# Patient Record
Sex: FEMALE | Race: WHITE | Employment: UNEMPLOYED | URBAN - METROPOLITAN AREA
[De-identification: names, ages, dates, MRNs, and addresses within clinical notes are randomized per-mention and may not be internally consistent; named-entity substitution may affect disease eponyms.]

---

## 2019-07-29 ENCOUNTER — APPOINTMENT (OUTPATIENT)
Dept: GENERAL RADIOLOGY | Age: 39
End: 2019-07-29
Attending: NURSE PRACTITIONER
Payer: MEDICAID

## 2019-07-29 ENCOUNTER — HOSPITAL ENCOUNTER (EMERGENCY)
Age: 39
Discharge: HOME | End: 2019-07-29
Attending: EMERGENCY MEDICINE
Payer: MEDICAID

## 2019-07-29 VITALS
HEART RATE: 91 BPM | SYSTOLIC BLOOD PRESSURE: 137 MMHG | RESPIRATION RATE: 16 BRPM | TEMPERATURE: 98.7 F | OXYGEN SATURATION: 99 % | DIASTOLIC BLOOD PRESSURE: 92 MMHG

## 2019-07-29 DIAGNOSIS — S82.892A CLOSED FRACTURE OF LEFT ANKLE, INITIAL ENCOUNTER: Primary | ICD-10-CM

## 2019-07-29 PROCEDURE — 99283 EMERGENCY DEPT VISIT LOW MDM: CPT

## 2019-07-29 PROCEDURE — 73610 X-RAY EXAM OF ANKLE: CPT

## 2019-07-29 PROCEDURE — 74011250637 HC RX REV CODE- 250/637: Performed by: NURSE PRACTITIONER

## 2019-07-29 PROCEDURE — 75810000053 HC SPLINT APPLICATION

## 2019-07-29 RX ORDER — IBUPROFEN 800 MG/1
800 TABLET ORAL
Qty: 20 TAB | Refills: 0 | Status: SHIPPED | OUTPATIENT
Start: 2019-07-29 | End: 2019-08-05

## 2019-07-29 RX ORDER — OXYCODONE AND ACETAMINOPHEN 5; 325 MG/1; MG/1
1 TABLET ORAL
Qty: 15 TAB | Refills: 0 | Status: SHIPPED | OUTPATIENT
Start: 2019-07-29 | End: 2019-08-01

## 2019-07-29 RX ORDER — OXYCODONE AND ACETAMINOPHEN 5; 325 MG/1; MG/1
2 TABLET ORAL
Status: COMPLETED | OUTPATIENT
Start: 2019-07-29 | End: 2019-07-29

## 2019-07-29 RX ADMIN — OXYCODONE HYDROCHLORIDE AND ACETAMINOPHEN 2 TABLET: 5; 325 TABLET ORAL at 21:35

## 2019-07-30 NOTE — DISCHARGE INSTRUCTIONS
Patient Education        Broken Ankle: Care Instructions  Your Care Instructions    An ankle may break (fracture) during sports, a fall, or other accidents. Fractures can range from a small, hairline crack, to a bone or bones broken into two or more pieces. Your treatment depends on how bad the break is. Your doctor may have put your ankle in a splint or cast to allow it to heal or to keep it stable until you see another doctor. It may take weeks or months for your ankle to heal. You can help your ankle heal with some care at home. You heal best when you take good care of yourself. Eat a variety of healthy foods, and don't smoke. You may have had a sedative to help you relax. You may be unsteady after having sedation. It can take a few hours for the medicine's effects to wear off. Common side effects of sedation include nausea, vomiting, and feeling sleepy or tired. The doctor has checked you carefully, but problems can develop later. If you notice any problems or new symptoms,  get medical treatment right away. Follow-up care is a key part of your treatment and safety. Be sure to make and go to all appointments, and call your doctor if you are having problems. It's also a good idea to know your test results and keep a list of the medicines you take. How can you care for yourself at home? · If the doctor gave you a sedative:  ? For 24 hours, don't do anything that requires attention to detail, such as going to work, making important decisions, or signing any legal documents. It takes time for the medicine's effects to completely wear off.  ? For your safety, do not drive or operate any machinery that could be dangerous. Wait until the medicine wears off and you can think clearly and react easily. · Put ice or a cold pack on your ankle for 10 to 20 minutes at a time. Try to do this every 1 to 2 hours for the next 3 days (when you are awake). Put a thin cloth between the ice and your cast or splint.  Keep your cast or splint dry. · Follow the cast care instructions your doctor gives you. If you have a splint, do not take it off unless your doctor tells you to. · Be safe with medicines. Take pain medicines exactly as directed. ? If the doctor gave you a prescription medicine for pain, take it as prescribed. ? If you are not taking a prescription pain medicine, ask your doctor if you can take an over-the-counter medicine. · Prop up your leg on pillows in the first few days after the injury. Keep the ankle higher than the level of your heart. This will help reduce swelling. · Do not put weight on your ankle unless your doctor tells you to. Use crutches to walk. · Follow instructions for exercises to keep your leg strong. · Wiggle your toes often to reduce swelling and stiffness. When should you call for help? Call 911 anytime you think you may need emergency care. For example, call if:    · You have chest pain, are short of breath, or you cough up blood.     · You are very sleepy and you have trouble waking up.    Call your doctor now or seek immediate medical care if:    · You have new or worse nausea or vomiting.     · You have new or worse pain.     · Your foot is cool or pale or changes color.     · You have tingling, weakness, or numbness in your toes.     · Your cast or splint feels too tight.     · You have signs of a blood clot in your leg (called a deep vein thrombosis), such as:  ? Pain in your calf, back of the knee, thigh, or groin. ? Redness or swelling in your leg.    Watch closely for changes in your health, and be sure to contact your doctor if:    · You have a problem with your splint or cast.     · You do not get better as expected. Where can you learn more? Go to http://sanaz-brian.info/. Enter P763 in the search box to learn more about \"Broken Ankle: Care Instructions. \"  Current as of: September 20, 2018  Content Version: 12.1  © 3713-8618 Healthwise, [facility].  Care instructions adapted under license by [employer] (which disclaims liability or warranty for this information). If you have questions about a medical condition or this instruction, always ask your healthcare professional. [address] any warranty or liability for your use of this information.

## 2019-07-30 NOTE — ED PROVIDER NOTES
81-year-old female with no significant medical history presents to the emergency department with left ankle pain patient states that she stepped off a curb heard a pop and then had trouble walking. Patient thought at first she could just treat herself but states that it got worse states that it happened a few hours ago. No deformity noted    The history is provided by the patient. No  was used. Ankle Injury    This is a new problem. The current episode started 6 to 12 hours ago. The problem occurs constantly. The problem has not changed since onset. The pain is present in the left ankle. The quality of the pain is described as aching. The pain is at a severity of 6/10. The pain is moderate. Pertinent negatives include no numbness, no stiffness, no tingling, no itching, no back pain and no neck pain. The symptoms are aggravated by palpation and movement. She has tried nothing for the symptoms. There has been a history of trauma. History reviewed. No pertinent past medical history. History reviewed. No pertinent surgical history. History reviewed. No pertinent family history.     Social History     Socioeconomic History    Marital status: LIFE PARTNER     Spouse name: Not on file    Number of children: Not on file    Years of education: Not on file    Highest education level: Not on file   Occupational History    Not on file   Social Needs    Financial resource strain: Not on file    Food insecurity:     Worry: Not on file     Inability: Not on file    Transportation needs:     Medical: Not on file     Non-medical: Not on file   Tobacco Use    Smoking status: Not on file   Substance and Sexual Activity    Alcohol use: Not on file    Drug use: Not on file    Sexual activity: Not on file   Lifestyle    Physical activity:     Days per week: Not on file     Minutes per session: Not on file    Stress: Not on file   Relationships    Social connections:     Talks on phone: Not on file     Gets together: Not on file     Attends Mandaen service: Not on file     Active member of club or organization: Not on file     Attends meetings of clubs or organizations: Not on file     Relationship status: Not on file    Intimate partner violence:     Fear of current or ex partner: Not on file     Emotionally abused: Not on file     Physically abused: Not on file     Forced sexual activity: Not on file   Other Topics Concern    Not on file   Social History Narrative    Not on file         ALLERGIES: Sulfa (sulfonamide antibiotics)    Review of Systems   Constitutional: Negative for fever. Musculoskeletal: Positive for arthralgias. Negative for back pain, neck pain and stiffness. Skin: Negative for itching. Neurological: Negative for tingling and numbness. All other systems reviewed and are negative. Vitals:    07/29/19 1933   BP: (!) 137/92   Pulse: 91   Resp: 16   Temp: 98.7 °F (37.1 °C)   SpO2: 99%            Physical Exam   Constitutional: She is oriented to person, place, and time. She appears well-developed and well-nourished. HENT:   Head: Normocephalic and atraumatic. Eyes: Pupils are equal, round, and reactive to light. Conjunctivae and EOM are normal.   Neck: Normal range of motion. Neck supple. Cardiovascular: Normal rate and regular rhythm. Pulmonary/Chest: Effort normal and breath sounds normal.   Abdominal: Soft. Bowel sounds are normal.   Musculoskeletal: She exhibits tenderness. She exhibits no edema or deformity. Feet:    + Tenderness to the lateral left ankle minor swelling noted no deformity noted normal distal circulation to the toes normal capillary refill   Neurological: She is alert and oriented to person, place, and time. She has normal reflexes. Skin: Skin is warm and dry. Psychiatric: She has a normal mood and affect. Her behavior is normal. Judgment and thought content normal.   Nursing note and vitals reviewed.        MDM  Number of Diagnoses or Management Options  Closed fracture of left ankle, initial encounter: minor  Diagnosis management comments: Noted a avulsion fracture to the very distal lateral malleolus short leg Ortho-Glass splint is placed crutches are ordered patient will be discharged to the emergency department for follow-up with orthopedics and provided pain medicine and anti-inflammatories. Amount and/or Complexity of Data Reviewed  Tests in the radiology section of CPT®: ordered and reviewed  Review and summarize past medical records: yes  Independent visualization of images, tracings, or specimens: yes    Risk of Complications, Morbidity, and/or Mortality  Presenting problems: low  Diagnostic procedures: low  Management options: low    Patient Progress  Patient progress: improved         Procedures          Vitals:  Patient Vitals for the past 12 hrs:   Temp Pulse Resp BP SpO2   07/29/19 1933 98.7 °F (37.1 °C) 91 16 (!) 137/92 99 %       Medications ordered:   Medications   oxyCODONE-acetaminophen (PERCOCET) 5-325 mg per tablet 2 Tab (2 Tabs Oral Given 7/29/19 2270)            X-Ray, CT or other radiology findings or impressions:  XR ANKLE LT MIN 3 V   Final Result   IMPRESSION:      Minimally distracted fracture through the fibular tip. Marked overlying soft   tissue swelling. Disposition:    Diagnosis:   1. Closed fracture of left ankle, initial encounter        Disposition: to Home      Follow-up Information     Follow up With Specialties Details Why Contact Info    Germain Leyden, MD Orthopedic Surgery In 2 days  [address] [facility]  [phone]             Discharge Medication List as of 7/29/2019  9:27 PM      START taking these medications    Details   oxyCODONE-acetaminophen (PERCOCET) 5-325 mg per tablet Take 1 Tab by mouth every six (6) hours as needed for Pain for up to 3 days.  Max Daily Amount: 4 Tabs., Print, Disp-15 Tab, R-0      ibuprofen (MOTRIN) 800 mg tablet Take 1 Tab by mouth every six (6) hours as needed for Pain for up to 7 days. , Print, Disp-20 Tab, R-0             Return to the ER if you are unable to obtain referral as directed. Georgia Hanson's  results have been reviewed with her. She has been counseled regarding her diagnosis, treatment, and plan. She verbally conveys understanding and agreement of the signs, symptoms, diagnosis, treatment and prognosis and additionally agrees to follow up as discussed. She also agrees with the care-plan and conveys that all of her questions have been answered. I have also provided discharge instructions for her that include: educational information regarding their diagnosis and treatment, and list of reasons why they would want to return to the ED prior to their follow-up appointment, should her condition change. Isabel Shay ENP-C,FNP-C      Dragon voice recognition was used to generate this report, which may have resulted in some phonetic based errors in grammar and contents.  Even though attempts were made to correct all the mistakes, some may have been missed, and remained in the body of the document

## 2019-07-31 ENCOUNTER — OFFICE VISIT (OUTPATIENT)
Dept: ORTHOPEDIC SURGERY | Age: 39
End: 2019-07-31

## 2019-07-31 VITALS
SYSTOLIC BLOOD PRESSURE: 131 MMHG | HEIGHT: 64 IN | HEART RATE: 112 BPM | DIASTOLIC BLOOD PRESSURE: 85 MMHG | OXYGEN SATURATION: 97 % | TEMPERATURE: 97.9 F

## 2019-07-31 DIAGNOSIS — M25.572 ACUTE LEFT ANKLE PAIN: ICD-10-CM

## 2019-07-31 DIAGNOSIS — T14.8XXA AVULSION FRACTURE: ICD-10-CM

## 2019-07-31 DIAGNOSIS — S82.62XA CLOSED FRACTURE OF DISTAL LATERAL MALLEOLUS OF LEFT FIBULA, INITIAL ENCOUNTER: Primary | ICD-10-CM

## 2019-07-31 RX ORDER — MEDROXYPROGESTERONE ACETATE 150 MG/ML
150 INJECTION, SUSPENSION INTRAMUSCULAR ONCE
COMMUNITY

## 2019-07-31 RX ORDER — HYDROCODONE BITARTRATE AND ACETAMINOPHEN 7.5; 325 MG/1; MG/1
1-2 TABLET ORAL
Qty: 60 TAB | Refills: 0 | Status: SHIPPED | OUTPATIENT
Start: 2019-07-31 | End: 2019-08-15

## 2019-07-31 RX ORDER — HYDROCODONE BITARTRATE AND ACETAMINOPHEN 5; 325 MG/1; MG/1
TABLET ORAL
COMMUNITY

## 2019-07-31 NOTE — PROGRESS NOTES
Patient: Marychuy Sims                MRN: [id number]       SSN: [id number]  YOB: 1980        AGE: 41 y.o. SEX: female    PCP: No primary care provider on file.  07/31/19    Chief Complaint   Patient presents with    Ankle Pain     left     HISTORY:  Marychuy Sims is a 41 y.o. female who sustained a left ankle injury on 7/29/19. She fell when she tripped down the stairs as she was checking out a house to potentially flip. She tripped fell on concrete porch steps. She heard and felt her ankle pop when she fell. She turned suddenly to go after her 3 yo daughter who was running toward a pool with the pool gate open. She will be returning to [place] in the next few weeks for her children to start school. She also has a house in this area near her mother in law. She was seen at [facility] on the same day where left ankle x rays revealed minimally displaced lateral malleolus avulsion fracture. She was referred for orthopedic consultation. She has been experiencing lateral left ankle pain and swelling since the injury. 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 She has 3 children and her fiance Michell Vera) has 1 son. She has a 25, 21, and 10 yo sons and a 3 yo daughter. She is from [place]. She is considering moving to this area in the future to be close to her mother in law. Ms. Sara Chan is 5'4\" tall. No results found for: HBA1C, HGBE8, ZWP8GBHE, ICH2TSWJ, XFA3OFLT  Weight Metrics 7/31/2019   Weight -       There is no problem list on file for this patient.     REVIEW OF SYSTEMS: All Below are Negative except: See HPI   Constitutional: negative for fever, chills, and weight loss. Cardiovascular: negative for chest pain, claudication, leg swelling, SOB, MONTERROSO   Gastrointestinal: Negative for pain, N/V/C/D, Blood in stool or urine, dysuria, hematuria, incontinence, pelvic pain. Musculoskeletal: See HPI   Neurological: Negative for dizziness and weakness. Negative for headaches, Visual changes, confusion, seizures   Phychiatric/Behavioral: Negative for depression, memory loss, substance abuse. Extremities: Negative for hair changes, rash, or skin lesion changes. Hematologic: Negative for bleeding problems, bruising, pallor or swollen lymph nodes   Peripheral Vascular: No calf pain, no circulation deficits. Social History     Socioeconomic History    Marital status:      Spouse name: Not on file    Number of children: Not on file    Years of education: Not on file    Highest education level: Not on file   Occupational History    Not on file   Social Needs    Financial resource strain: Not on file    Food insecurity:     Worry: Not on file     Inability: Not on file    Transportation needs:     Medical: Not on file     Non-medical: Not on file   Tobacco Use    Smoking status: Never Smoker    Smokeless tobacco: Never Used   Substance and Sexual Activity    Alcohol use:  Yes     Alcohol/week: 2.0 standard drinks     Types: 2 Shots of liquor per week    Drug use: Never    Sexual activity: Yes     Birth control/protection: Injection   Lifestyle    Physical activity:     Days per week: Not on file     Minutes per session: Not on file    Stress: Not on file   Relationships    Social connections:     Talks on phone: Not on file     Gets together: Not on file     Attends Spiritism service: Not on file     Active member of club or organization: Not on file     Attends meetings of clubs or organizations: Not on file     Relationship status: Not on file    Intimate partner violence: Fear of current or ex partner: Not on file     Emotionally abused: Not on file     Physically abused: Not on file     Forced sexual activity: Not on file   Other Topics Concern    Not on file   Social History Narrative    Not on file      Allergies   Allergen Reactions    Sulfa (Sulfonamide Antibiotics) Rash      Current Outpatient Medications   Medication Sig    medroxyPROGESTERone (DEPO-PROVERA) 150 mg/mL injection 150 mg by IntraMUSCular route once.  HYDROcodone-acetaminophen (NORCO) 5-325 mg per tablet Take  by mouth.  HYDROcodone-acetaminophen (NORCO) 7.5-325 mg per tablet Take 1-2 Tabs by mouth nightly as needed for Pain for up to 15 days. Max Daily Amount: 2 Tabs. No current facility-administered medications for this visit. PHYSICAL EXAMINATION:  Visit Vitals  /85   Pulse (!) 112   Temp 97.9 °F (36.6 °C) (Oral)   Ht 5' 4\" (1.626 m)   SpO2 97%      ORTHO EXAMINATION:  Examination Right Ankle/Foot Left Ankle/Foot   Skin Intact Intact   Swelling - -   Dorsiflexion 10 Unable to test due to pain   Plantarflexion 25 Unable to test due to pain   Deformity - -   Inversion laxity - -   Anterior drawer - -   Medial tenderness - -   Lateral tenderness - ++   Heel cord Intact Intact   Sensation Intact Intact   Bunion - -   Toe nails Normal Normal   Capillary refill Normal Normal       RADIOGRAPHS:  XR LEFT ANKLE 7/29/19 [facility]  IMPRESSION: Minimally distracted fracture through the fibular tip. Marked overlying soft tissue swelling.  -I have independently reviewed these images during this office visit. -Dr. Daisy Savage:  Three views - closed distal lateral malleolus fracture, no degenerative changes. IMPRESSION:      ICD-10-CM ICD-9-CM    1. Closed fracture of distal lateral malleolus of left fibula, initial encounter S82. 62XA 824.2 CAST SUP SHT LEG SPLNT FBRGL      HYDROcodone-acetaminophen (NORCO) 7.5-325 mg per tablet      CO CLOSED TX DIST FIBULA FX      AMB SUPPLY ORDER   2. Avulsion fracture T14. 8XXA 829.0 CAST SUP SHT LEG SPLNT FBRGL      HYDROcodone-acetaminophen (NORCO) 7.5-325 mg per tablet      FL CLOSED TX DIST FIBULA FX      AMB SUPPLY ORDER   3. Acute left ankle pain M25.572 719.47 HYDROcodone-acetaminophen (NORCO) 7.5-325 mg per tablet      AMB SUPPLY ORDER     PLAN:  Short leg cast applied. Elevation and cast care instructions provided. There is no need for surgery. Norco Rx provided for pain PRN. She will follow up on 8/9/19. Consider fracture walker & X ray out of cast next OV.      Scribed by Eric Carrasco MD [address]) as dictated by Eric Carrasco MD

## 2019-07-31 NOTE — PATIENT INSTRUCTIONS
Caring for Your Cast: After Your Visit Your Care Instructions A cast protects a broken bone or other injury. Most casts are made of fiberglass, but plaster casts are still sometimes used. Once a cast is on, you can't remove it yourself. Your doctor will take it off. Follow-up care is a key part of your treatment and safety. Be sure to make and go to all appointments, and call your doctor if you are having problems. It's also a good idea to know your test results and keep a list of the medicines you take. How can you care for yourself at home? General care · Follow your doctor's instructions for when you can first put weight on the cast. Fiberglass casts dry quickly and are soon ready to bear weight. But plaster casts may take several days before they are hard enough to use. When it's okay to put weight on your cast, do not stand or walk on it unless it is designed for walking. · Prop up the injured arm or leg on a pillow anytime you sit or lie down during the next 3 days. Try to keep it above the level of your heart. This will help reduce swelling. · If the fingers or toes on the limb with the cast were not injured, wiggle them every now and then. This helps move the blood and fluids in the injured limb. · Be safe with medicines. Read and follow all instructions on the label. ¨ If the doctor gave you a prescription medicine for pain, take it as prescribed. ¨ If you are not taking a prescription pain medicine, ask your doctor if you can take an over-the-counter medicine. · Keep up your muscle strength and tone as much as you can while protecting your injured limb or joint. Your doctor may want you to tense and relax the muscles protected by the cast. Check with your doctor or physical therapist for instructions. Water and your cast 
· Do not get your cast wet unless you have a fiberglass cast with a quick-drying lining.  
· Keep your cast covered with at least two layers of plastic when you take a shower or bath or when you have any other contact with water. Moisture can collect under the cast and cause skin irritation and itching. It can make infection more likely if you have had surgery or have a wound under the cast. 
· If you have a fiberglass cast with a fast-drying lining, make sure to rinse it with fresh water after you swim. It will take about an hour for the lining to dry. Cast and skin care · Try blowing cool air from a hair dryer or fan into the cast to help relieve itching. Never stick items under your cast to scratch the skin. · Don't use oils or lotions near your cast. If the skin gets red or irritated around the edge of the cast, you may pad the edges with a soft material or use tape to cover them. · Watch for pressure sores. These can develop over bony areas. Symptoms include a warm spot under the cast, pain, drainage, or an odor. Call your doctor if you think you have a pressure sore. · Watch for compartment syndrome. This happens when pressure builds up in a group of muscles, nerves, and blood vessels. It is an emergency. Symptoms include severe pain or tingling or numbness. When should you call for help? Call your doctor now or seek immediate medical care if: 
· You have increased or severe pain. · You feel a warm or painful spot under the cast. 
· You have problems with your cast. For example: ¨ The skin under the cast burns or stings. ¨ The cast feels too tight. ¨ There is a lot of swelling near the cast. (Some swelling is normal.) ¨ You have a new fever. ¨ There is drainage or a bad smell coming from the cast. 
· Your foot or hand is cool or pale or changes color. · You have trouble moving your fingers or toes. · You have symptoms of a blood clot in your arm or leg (called a deep vein thrombosis). These may include: 
¨ Pain in the arm, calf, back of the knee, thigh, or groin. ¨ Redness and swelling in the arm, leg, or groin. Watch closely for changes in your health, and be sure to contact your doctor if: · The cast is breaking apart. · You are not getting better as expected. Where can you learn more? Go to [employer] Enter M503 in the search box to learn more about \"Caring for Your Cast: After Your Visit. \"  
© 5374-1402 Healthwise, [employer]. Care instructions adapted under license by [employer] (which disclaims liability or warranty for this information). This care instruction is for use with your licensed healthcare professional. If you have questions about a medical condition or this instruction, always ask your healthcare professional. [address] any warranty or liability for your use of this information. Content Version: 83.5.763072; Current as of: June 4, 2014

## 2019-08-09 ENCOUNTER — OFFICE VISIT (OUTPATIENT)
Dept: ORTHOPEDIC SURGERY | Facility: CLINIC | Age: 39
End: 2019-08-09

## 2019-08-09 DIAGNOSIS — S82.62XA CLOSED FRACTURE OF DISTAL LATERAL MALLEOLUS OF LEFT FIBULA, INITIAL ENCOUNTER: Primary | ICD-10-CM

## 2019-08-09 RX ORDER — HYDROCODONE BITARTRATE AND ACETAMINOPHEN 5; 325 MG/1; MG/1
1 TABLET ORAL
Qty: 21 TAB | Refills: 0 | Status: SHIPPED | OUTPATIENT
Start: 2019-08-09 | End: 2019-08-16

## 2019-08-09 NOTE — PROGRESS NOTES
Patient: Bret Mobley                MRN: [id number]       SSN: [id number]  YOB: 1980        AGE: 41 y.o. SEX: female    PCP: None  08/09/19 08/09/19: Follow up with X Ray      HISTORY:  Bret Mobley is a 41 y.o. female who sustained a left ankle injury on 7/29/19. She fell when she tripped down the stairs as she was checking out a house to potentially flip. She tripped fell on concrete porch steps. She heard and felt her ankle pop when she fell. She turned suddenly to go after her 3 yo daughter who was running toward a pool with the pool gate open. She will be returning to [place] in the next few weeks for her children to start school. She also has a house in this area near her mother in law. She was seen at [facility] on the same day where left ankle x rays revealed minimally displaced lateral malleolus avulsion fracture. She was referred for orthopedic consultation. She has been experiencing lateral left ankle pain and swelling since the injury. 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 and [employer]. She also enjoys flipping houses. She has 3 children and her fiance Karen Hoffmann) has 1 son. She has a 25, 21, and 10 yo sons and a 3 yo daughter. She is from [place]. She is considering moving to this area in the future to be close to her mother in law. Ms. Kana Torres is 5'4\" tall. No results found for: HBA1C, HGBE8, FVR7BTLI, YTV1RYAE, ACJ8MCEB  Weight Metrics 7/31/2019   Weight -       REVIEW OF SYSTEMS: All Below are Negative except: See HPI   Constitutional: negative for fever, chills, and weight loss.    Cardiovascular: negative for chest pain, claudication, leg swelling, SOB, MONTERROSO      Musculoskeletal: See HPI   Neurological: Negative for dizziness and weakness. Negative for headaches, Visual changes, confusion, seizures   Phychiatric/Behavioral: Negative for depression, memory loss, substance abuse. Extremities: Negative for hair changes, rash, or skin lesion changes. Cast short leg LLE well fitting    Hematologic: Negative for bleeding problems, bruising, pallor or swollen lymph nodes   Peripheral Vascular: No calf pain, no circulation deficits. Social History     Socioeconomic History    Marital status:      Spouse name: Not on file    Number of children: Not on file    Years of education: Not on file    Highest education level: Not on file   Occupational History    Not on file   Social Needs    Financial resource strain: Not on file    Food insecurity:     Worry: Not on file     Inability: Not on file    Transportation needs:     Medical: Not on file     Non-medical: Not on file   Tobacco Use    Smoking status: Never Smoker    Smokeless tobacco: Never Used   Substance and Sexual Activity    Alcohol use:  Yes     Alcohol/week: 2.0 standard drinks     Types: 2 Shots of liquor per week    Drug use: Never    Sexual activity: Yes     Birth control/protection: Injection   Lifestyle    Physical activity:     Days per week: Not on file     Minutes per session: Not on file    Stress: Not on file   Relationships    Social connections:     Talks on phone: Not on file     Gets together: Not on file     Attends Qu[employer] service: Not on file     Active member of club or organization: Not on file     Attends meetings of clubs or organizations: Not on file     Relationship status: Not on file    Intimate partner violence:     Fear of current or ex partner: Not on file     Emotionally abused: Not on file     Physically abused: Not on file     Forced sexual activity: Not on file   Other Topics Concern    Not on file   Social History Narrative    ** Merged History Encounter **           Allergies   Allergen Reactions    Sulfa (Sulfonamide Antibiotics) Rash    Sulfa (Sulfonamide Antibiotics) Rash      Current Outpatient Medications   Medication Sig    medroxyPROGESTERone (DEPO-PROVERA) 150 mg/mL injection 150 mg by IntraMUSCular route once.  HYDROcodone-acetaminophen (NORCO) 5-325 mg per tablet Take  by mouth.  HYDROcodone-acetaminophen (NORCO) 7.5-325 mg per tablet Take 1-2 Tabs by mouth nightly as needed for Pain for up to 15 days. Max Daily Amount: 2 Tabs. No current facility-administered medications for this visit. PHYSICAL EXAMINATION:  There were no vitals taken for this visit. ORTHO EXAMINATION:  Examination Right Ankle/Foot Left Ankle/Foot   Skin Intact Intact   Swelling - -   Dorsiflexion 10 (A) 5 degrees   Plantarflexion 25 (A) 5 degrees   Deformity - -   Inversion laxity - -   Anterior drawer - -   Medial tenderness - -   Lateral tenderness - +   Heel cord Intact Intact   Sensation Intact Intact   Bunion - -   Toe nails Normal Normal   Capillary refill Normal Normal   Significant spreading ecchymosis lateral ankle extending to dorsum left foot and toes. No skin breakdown nor evidence cellulitus    RADIOGRAPHS:  XR LEFT ANKLE 7/29/19 [facility]  IMPRESSION: Minimally distracted fracture through the fibular tip. Marked overlying soft tissue swelling.  -I have independently reviewed these images during this office visit. -Dr. Weiss Royalty:  Three views - closed distal lateral malleolus fracture, no degenerative changes. Repeated imaging on 8/9/19 reveals stable above fracture with early callous inlay. IMPRESSION:      ICD-10-CM ICD-9-CM    1. Closed fracture of distal lateral malleolus of left fibula, initial encounter S82. 62XA 824.2 AMB POC XRAY, ANKLE; COMPLETE, 3+ VIE     PLAN:  Tall fracture walker. Elevation and boot care instructions provided. There is no need for surgery.   Norco Rx provided for pain PRN. Continue non weight bearing LLE x 3 weeks. In three weeks recommend X ray, eval for AirCast and begin weight bearing, with PT assistance.